# Patient Record
Sex: MALE | Race: WHITE | NOT HISPANIC OR LATINO | Employment: STUDENT | URBAN - METROPOLITAN AREA
[De-identification: names, ages, dates, MRNs, and addresses within clinical notes are randomized per-mention and may not be internally consistent; named-entity substitution may affect disease eponyms.]

---

## 2024-07-16 ENCOUNTER — APPOINTMENT (OUTPATIENT)
Dept: PRIMARY CARE | Facility: CLINIC | Age: 24
End: 2024-07-16
Payer: COMMERCIAL

## 2024-07-19 ENCOUNTER — HOSPITAL ENCOUNTER (OUTPATIENT)
Dept: RADIOLOGY | Facility: CLINIC | Age: 24
Discharge: HOME | End: 2024-07-19
Payer: COMMERCIAL

## 2024-07-19 ENCOUNTER — OFFICE VISIT (OUTPATIENT)
Dept: PRIMARY CARE | Facility: CLINIC | Age: 24
End: 2024-07-19
Payer: COMMERCIAL

## 2024-07-19 VITALS
BODY MASS INDEX: 33.32 KG/M2 | OXYGEN SATURATION: 97 % | RESPIRATION RATE: 16 BRPM | HEIGHT: 71 IN | TEMPERATURE: 97.9 F | DIASTOLIC BLOOD PRESSURE: 68 MMHG | SYSTOLIC BLOOD PRESSURE: 104 MMHG | WEIGHT: 238 LBS | HEART RATE: 83 BPM

## 2024-07-19 DIAGNOSIS — S76.212A STRAIN OF GROIN, LEFT, INITIAL ENCOUNTER: ICD-10-CM

## 2024-07-19 DIAGNOSIS — S76.212A STRAIN OF GROIN, LEFT, INITIAL ENCOUNTER: Primary | ICD-10-CM

## 2024-07-19 PROCEDURE — 99213 OFFICE O/P EST LOW 20 MIN: CPT | Performed by: FAMILY MEDICINE

## 2024-07-19 PROCEDURE — 3008F BODY MASS INDEX DOCD: CPT | Performed by: FAMILY MEDICINE

## 2024-07-19 PROCEDURE — 73502 X-RAY EXAM HIP UNI 2-3 VIEWS: CPT | Mod: LT

## 2024-07-19 ASSESSMENT — PAIN SCALES - GENERAL: PAINLEVEL: 4

## 2024-07-19 NOTE — PROGRESS NOTES
"Subjective   Patient ID: Israel Diaz is a 23 y.o. male who presents for Groin Pain (Pt is here with complaints of groin pain x 5 months.).    HPI He played baseball for [employer] and in Feb was catching and had left groin pain and it has persisted for months.  No ecchymosis.      Review of Systems see HPI    Objective   /68 (BP Location: Left arm, Patient Position: Sitting, BP Cuff Size: Adult)   Pulse 83   Temp 36.6 °C (97.9 °F) (Temporal)   Resp 16   Ht 1.803 m (5' 11\")   Wt 108 kg (238 lb)   SpO2 97%   BMI 33.19 kg/m²     Physical Exam  Constitutional:       General: He is not in acute distress.     Appearance: Normal appearance.   Cardiovascular:      Rate and Rhythm: Normal rate and regular rhythm.      Heart sounds: No murmur heard.  Pulmonary:      Breath sounds: Normal breath sounds. No wheezing.   Musculoskeletal:      Comments: Left groin with some tenderness to palpation.  Good rom of hips.  No ecchymosis.  NT lateral hip.    Neurological:      Mental Status: He is alert.         Assessment/Plan   Problem List Items Addressed This Visit    None  Visit Diagnoses         Codes    Strain of groin, left, initial encounter    -  Primary S76.212A    Relevant Orders    Referral to Physical Therapy    XR hip left with pelvis when performed 2 or 3 views (Completed)        Recheck post PT       "

## 2024-07-31 ENCOUNTER — EVALUATION (OUTPATIENT)
Dept: PHYSICAL THERAPY | Facility: CLINIC | Age: 24
End: 2024-07-31
Payer: COMMERCIAL

## 2024-07-31 DIAGNOSIS — S76.212A STRAIN OF GROIN, LEFT, INITIAL ENCOUNTER: ICD-10-CM

## 2024-07-31 PROCEDURE — 97161 PT EVAL LOW COMPLEX 20 MIN: CPT | Mod: GP | Performed by: PHYSICAL THERAPIST

## 2024-07-31 SDOH — ECONOMIC STABILITY: GENERAL: QUALITY OF LIFE: GOOD

## 2024-07-31 ASSESSMENT — PAIN - FUNCTIONAL ASSESSMENT: PAIN_FUNCTIONAL_ASSESSMENT: 0-10

## 2024-07-31 ASSESSMENT — ACTIVITIES OF DAILY LIVING (ADL): POOR_BALANCE: 1

## 2024-07-31 ASSESSMENT — ENCOUNTER SYMPTOMS
PAIN SCALE: 3
PAIN SCALE AT HIGHEST: 9
QUALITY: RADIATING
ALLEVIATING FACTORS: ICE
EXACERBATED BY: LIFTING
EXACERBATED BY: MOVEMENT
PAIN SCALE AT LOWEST: 3
QUALITY: DULL ACHE

## 2024-07-31 ASSESSMENT — PAIN SCALES - GENERAL: PAINLEVEL_OUTOF10: 3

## 2024-07-31 NOTE — PROGRESS NOTES
Physical Therapy Evaluation and Treatment     Patient Name: Israel Diaz  MRN: [id number]  Visit Date: 2024  Time Calculation  Start Time: 1445  Stop Time: 1530  Time Calculation (min): 45 min  PT Evaluation Time Entry  PT Evaluation (Low) Time Entry: 30  PT Therapeutic Procedures Time Entry  Therapeutic Exercise Time Entry: 15    Visit # 1 of 20  Visits/Dates Authorized: awaiting insurance approval    Current Problem:   Problem List Items Addressed This Visit    None  Visit Diagnoses         Codes    Strain of groin, left, initial encounter     S76.212A    Relevant Orders    Follow Up In Physical Therapy          Precautions:       Subjective Evaluation    History of Present Illness  Mechanism of injury: Pt is a 22 y/o male c/o left groin pain, thinks he tore his groin in March in a baseball. Cannot remember specific mechanism of injury, but next morning after the game he was in pain. 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 Pt had see the AT daily for pain management. (Contrast therapy and compression) Bruising from groin to L knee, pain is now radiating to his low back. After injury first happened there was N/T/B in LLE, but not now. Knee buckle, weakness. Any hip ER HURTS. hE DOES MAINTENANCE AND 4-5 HOURS INTO HIS SHIFT IT BECOMES HARD TO WALK AND INCREASED LBP. Does a lot of bending and lifting and carrying loads. Stairs are no trouble, pain with in/out of car. Long car rides increase pain and then when he has to get out pain is 8-9/10. 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     Quality of life: good    Pain  Current pain rating: 3  At best pain rating: 3  At worst pain ratin  Quality: dull ache and radiating  Relieving factors: ice  Aggravating factors: movement and lifting  Progression: no change    Treatments  Treatments tried: saw his AT at school.  Current treatment: physical therapy  Patient Goals  Patient goals for therapy: increased strength, independence  with ADLs/IADLs, return to sport/leisure activities, return to work, increased motion, improved balance, decreased pain and decreased edema  Patient goal: get back to running, working out, baseball         Objective     Active Range of Motion     Lumbar   Flexion: WFL  Extension: WFL  Left lateral flexion: WFL  Right lateral flexion: WFL  Left rotation: WFL  Right rotation: WFL    Additional Active Range of Motion Details  Repeated flexion was fine. Loaded lumbar flexion is what causes pain for pt at work.   Extension relieved LBP.     Strength/Myotome Testing     Left Hip   Planes of Motion   Flexion: 3+  Extension: 3  Abduction: 3  Adduction: 3- (hip add also casued 8/10 LBP)  External rotation: 4+  Internal rotation: 4+    Right Hip   Planes of Motion   Flexion: 4+  Extension: 4+  Abduction: 4+  Adduction: 4+  External rotation: 4+  Internal rotation: 4+    Left Knee   Flexion: 4+  Extension: 4+    Right Knee   Flexion: 5  Extension: 5    Additional Strength Details  ER/IR checked in prone- no pain in prone  Seated 90/90 ER/IR- LLE pain with IR MMT       TTP L greater troch, PSIS, L border of sacrum, pirif pain    Objective     Active Range of Motion     Lumbar   Flexion: WFL  Extension: WFL  Left lateral flexion: WFL  Right lateral flexion: WFL  Left rotation: WFL  Right rotation: WFL    Additional Active Range of Motion Details  Repeated flexion was fine. Loaded lumbar flexion is what causes pain for pt at work.   Extension relieved LBP.     Strength/Myotome Testing     Left Hip   Planes of Motion   Flexion: 3+  Extension: 3  Abduction: 3  Adduction: 3- (hip add also casued 8/10 LBP)  External rotation: 4+  Internal rotation: 4+    Right Hip   Planes of Motion   Flexion: 4+  Extension: 4+  Abduction: 4+  Adduction: 4+  External rotation: 4+  Internal rotation: 4+    Left Knee   Flexion: 4+  Extension: 4+    Right Knee   Flexion: 5  Extension: 5    Additional Strength Details  ER/IR checked in prone- no pain in  prone  Seated 90/90 ER/IR- LLE pain with IR MMT            Romberg: Firm Eyes Open 30 sec, Eyes Closed 30 sec , Foam Eyes Open 30 sec, Eyes Closed 30 sec   Tandem: Firm Eyes Open 30/30 sec  Single leg: Firm Eyes Open 30/30 sec some LLE groin discomfort with prolonged SLS       Treatments:     Therapeutic Exercise:   HEP reviewed and progressions discussed  PPT, PPT hip add, PPT march, PPT knee falls  Modif knees bent side plank  LLE stance hip opener/airplane rotations  LLE SLS with RLE hip flex/ext WS  Resisted hip flexion in supine and in standing.  Modif not to end range hip cars    Neuromuscular Re-Ed:     Gait Training:     Manual Therapy:   Prone PA's not painful  TTP to L border of sacrum, glute med and out laterally along iliac crest to grtr troch  Not TTP to piriformis    Modalities:   Dry needling following informed consent: with e-stim; 3Hz, mA to tolerance, applied to L sided lumbar paraspinals, L cluneals, L glute med, along iliac crest and pirif, for 10 minutes.      Assessment & Plan     Assessment  Impairments: abnormal muscle firing, abnormal muscle tone, activity intolerance, impaired balance, impaired physical strength, lacks appropriate home exercise program, pain with function and weight-bearing intolerance  Assessment details: Pt presents with groin strain, may have been a poorly healed groin tear and as a result of altered gait pattern pt is getting lateral hip pain and L sided lumbar and sacral pain. Pt requires skilled therapy to address functional pain and limitations in order to restore normal, pain free ROM and strength of LLE/HIP/GROIN and lumbar spine.   Prognosis: good    Goals  get back to running, working out, baseball    Plan  Therapy options: will be seen for skilled physical therapy services  Planned modality interventions: low level laser therapy, microcurrent electrical stimulation, TENS, ultrasound, high voltage pulsed current (pain management), cryotherapy and electrical  stimulation/J[place]ican stimulation  Planned therapy interventions: abdominal trunk stabilization, balance/weight-bearing training, body mechanics training, flexibility, functional ROM exercises, home exercise program, joint mobilization, stretching, strengthening, spinal/joint mobilization, postural training, neuromuscular re-education and manual therapy  Frequency: 2x week  Duration in visits: 20  Duration in weeks: 20  Treatment plan discussed with: patient  Plan details: For 3 weeks until pt moves to [place]       Low complexity due to patient's clinical presentation being stable and uncomplicated by any significant comorbidities that may affect rehab tolerance and progression.     Post-Treatment Pain:  Response to Interventions: same    Goals:   Active       PT Problem       lumbar       Start:  07/31/24    Expected End:  10/31/24       1) Pt will report pain levels no greater than 0/10 at worst with activity for less difficulty bending, lifting, transferring.   2) Pt will display pain-free lumbar spine AROM WFL for less difficulty bending, lifting, transferring and performing normal work activities.  3) Pt will be able to ambulate household and community distances with least restrictive AD.  4) Pt will score <10% impairment on Low Back Disability Questionnaire to indicate significant improvement in low back function.  5) Pt will improve bilateral hip and knee strength to at least 4+/5 for all major muscle groups for improved functional strength with transferring, stairs, and general mobility.           hip/groin       Start:  07/31/24    Expected End:  10/31/24       1) Pt will report pain levels no greater than 0/10 at worst with rest and activity for less difficulty sitting, standing, walking, transferring, negotiating stairs.  2) Pt will display left hip AROM WFL for less difficulty standing, walking, squatting, transferring, and performing bed mobility.  3) Pt will display left hip MMT of at least 4+/5 in all  planes for improved functional strength with walking, squatting, transferring, and negotiating stairs.  4) Pt will display symmetrical pain-free gait without AD and without compensation.  5) Pt will improve LEFS score to less than 10% impairment to indicate significant improvement in hip function and return to running and normal weight training.

## 2024-08-07 ENCOUNTER — TELEPHONE (OUTPATIENT)
Dept: PHYSICAL THERAPY | Facility: CLINIC | Age: 24
End: 2024-08-07
Payer: COMMERCIAL

## 2024-08-07 NOTE — TELEPHONE ENCOUNTER
[phone] [facility] to set up follow up physical therapy visits no pre-determination or auth required, [place] visits ref: [id number]

## 2024-08-08 ENCOUNTER — TREATMENT (OUTPATIENT)
Dept: PHYSICAL THERAPY | Facility: CLINIC | Age: 24
End: 2024-08-08
Payer: COMMERCIAL

## 2024-08-08 DIAGNOSIS — S76.212A STRAIN OF GROIN, LEFT, INITIAL ENCOUNTER: ICD-10-CM

## 2024-08-08 PROCEDURE — 97014 ELECTRIC STIMULATION THERAPY: CPT | Mod: GP

## 2024-08-08 PROCEDURE — 97110 THERAPEUTIC EXERCISES: CPT | Mod: GP

## 2024-08-08 ASSESSMENT — PAIN SCALES - GENERAL: PAINLEVEL_OUTOF10: 3

## 2024-08-08 ASSESSMENT — PAIN - FUNCTIONAL ASSESSMENT: PAIN_FUNCTIONAL_ASSESSMENT: 0-10

## 2024-08-08 NOTE — PROGRESS NOTES
Physical Therapy Treatment    Patient Name: Israel Diaz  MRN: [id number]  PT Received on: 8/8/2024    Time Calculation  Start Time: 1317  Stop Time: 1405  Time Calculation (min): 48 min  PT Modalities Time Entry  E-Stim (Unattended) Time Entry: 15  PT Therapeutic Procedures Time Entry  Manual Therapy Time Entry: 6  Therapeutic Exercise Time Entry: 25    Visit # 2 of 20  Visits/Dates Authorized: No pre-determination or auth required, [place]    Current Problem:   Problem List Items Addressed This Visit    None  Visit Diagnoses         Codes    Strain of groin, left, initial encounter     S76.212A          Precautions:    N/A    Subjective   General:    Feeling better in general since eval. Not having as much pain in the morning or with strenuous work duties.    Pre-Treatment Symptoms:   Pain Assessment: 0-10  0-10 (Numeric) Pain Score: 3    Objective   Findings:    From eval: Pt presents with groin strain, may have been a poorly healed groin tear and as a result of altered gait pattern pt is getting lateral hip pain and L sided lumbar and sacral pain.   Anterior hip pain with end-range hip flexion  LLE 1 inch shorter than R in supine, elevated L ASIS compared with R    Treatments:      Therapeutic Exercise:   Elliptical L1 - 5 min  Ball belt isometrics in hooklying   3-way bridge  DL bridge alt knee march 3x5 R/L   Quadruped sit back to pressup (pain-free)  Praying jesus pascual rollouts on [employer]  Bird dog x10 R/L  Open book R/L  R/L single leg bridge  Resisted hip flexion in standing org x12  Standing hip ext at shuttle 25# x12 L  L hip flex may / R glute may following by manually resisted HL hip add/abd  HEP reviewed and progressions discussed    DNP  PPT, PPT hip add, PPT march, PPT knee falls  Modif knees bent side plank  LLE stance hip opener/airplane rotations  LLE SLS with RLE hip flex/ext WS  Modif not to end range hip cars    Neuromuscular Re-Ed:     Gait Training:     Manual Therapy:   L hip distraction mobs    L lumbar gapping mobs  in R sidelying- pain    DNP  Prone PA's not painful  TTP to L border of sacrum, glute med and out laterally along iliac crest to grtr troch  Not TTP to piriformis    Modalities:   Dry needling following informed consent: with e-stim; 5Hz, mA to tolerance, applied to L sided lumbar paraspinals, L cluneals, L glute med, pirif, for 15 minutes.      Assessment:    Patient continues to have L sided lumbosacral, SI, and groin pain. Progressed clinic exercises for core /glute strengthening and pelvic symmetry, concluding with DN+stim for pain modulation.     Post-Treatment Symptoms:   Response to Interventions: A little sore    Plan:    Therapy options: will be seen for skilled physical therapy services  Planned modality interventions: low level laser therapy, microcurrent electrical stimulation, TENS, ultrasound, high voltage pulsed current (pain management), cryotherapy and electrical stimulation/Russian stimulation  Planned therapy interventions: abdominal trunk stabilization, balance/weight-bearing training, body mechanics training, flexibility, functional ROM exercises, home exercise program, joint mobilization, stretching, strengthening, spinal/joint mobilization, postural training, neuromuscular re-education and manual therapy  Frequency: 2x week  Duration in visits: 20  Duration in weeks: 20  Treatment plan discussed with: patient  Plan details: For 3 weeks until pt moves to [place]    Goals:   Active       PT Problem       lumbar       Start:  07/31/24    Expected End:  10/31/24       1) Pt will report pain levels no greater than 0/10 at worst with activity for less difficulty bending, lifting, transferring.   2) Pt will display pain-free lumbar spine AROM WFL for less difficulty bending, lifting, transferring and performing normal work activities.  3) Pt will be able to ambulate household and community distances with least restrictive AD.  4) Pt will score <10% impairment on Low Back  Disability Questionnaire to indicate significant improvement in low back function.  5) Pt will improve bilateral hip and knee strength to at least 4+/5 for all major muscle groups for improved functional strength with transferring, stairs, and general mobility.           hip/groin       Start:  07/31/24    Expected End:  10/31/24       1) Pt will report pain levels no greater than 0/10 at worst with rest and activity for less difficulty sitting, standing, walking, transferring, negotiating stairs.  2) Pt will display left hip AROM WFL for less difficulty standing, walking, squatting, transferring, and performing bed mobility.  3) Pt will display left hip MMT of at least 4+/5 in all planes for improved functional strength with walking, squatting, transferring, and negotiating stairs.  4) Pt will display symmetrical pain-free gait without AD and without compensation.  5) Pt will improve LEFS score to less than 10% impairment to indicate significant improvement in hip function and return to running and normal weight training.

## 2024-12-06 ENCOUNTER — DOCUMENTATION (OUTPATIENT)
Dept: PHYSICAL THERAPY | Facility: CLINIC | Age: 24
End: 2024-12-06
Payer: COMMERCIAL

## 2024-12-06 NOTE — PROGRESS NOTES
Physical Therapy    Discharge Summary    Name: Israel Diaz  MRN: [id number]  Date: 12/6/2024    Discharge Information:   Date of discharge 12/6/24  Date of last visit 8/8/24  Date of evaluation 7/31/24  Number of attended visits 2 (1 eval, 1 follow-up)    Referred for L groin pain    Therapy Summary: Feeling better in general since eval. Not having as much pain in the morning or with strenuous work duties.     Discharge Reason(s): Satisfied with progress, able to continue self-management independently